# Patient Record
Sex: MALE | URBAN - METROPOLITAN AREA
[De-identification: names, ages, dates, MRNs, and addresses within clinical notes are randomized per-mention and may not be internally consistent; named-entity substitution may affect disease eponyms.]

---

## 2021-06-11 ENCOUNTER — APPOINTMENT (RX ONLY)
Dept: URBAN - METROPOLITAN AREA CLINIC 2 | Facility: CLINIC | Age: 61
Setting detail: DERMATOLOGY
End: 2021-06-11

## 2021-06-11 DIAGNOSIS — B35.8 OTHER DERMATOPHYTOSES: ICD-10-CM | Status: RESOLVING

## 2021-06-11 DIAGNOSIS — L85.3 XEROSIS CUTIS: ICD-10-CM

## 2021-06-11 PROCEDURE — 99203 OFFICE O/P NEW LOW 30 MIN: CPT

## 2021-06-11 PROCEDURE — ? COUNSELING

## 2021-06-11 PROCEDURE — ? TREATMENT REGIMEN

## 2021-06-11 ASSESSMENT — LOCATION DETAILED DESCRIPTION DERM
LOCATION DETAILED: RIGHT ANTERIOR PROXIMAL THIGH
LOCATION DETAILED: LEFT ANTERIOR PROXIMAL THIGH

## 2021-06-11 ASSESSMENT — LOCATION SIMPLE DESCRIPTION DERM
LOCATION SIMPLE: RIGHT THIGH
LOCATION SIMPLE: LEFT THIGH

## 2021-06-11 ASSESSMENT — LOCATION ZONE DERM: LOCATION ZONE: LEG

## 2021-06-11 NOTE — PROCEDURE: TREATMENT REGIMEN
Plan: Patient was advised to continue using nystatin & Triamcinolone combination cream prescribed by PCP
Detail Level: Zone
Plan: Patient was recommended to use daily moisturizer and sunscreen 30+ spf